# Patient Record
Sex: FEMALE | Race: ASIAN | Employment: OTHER
[De-identification: names, ages, dates, MRNs, and addresses within clinical notes are randomized per-mention and may not be internally consistent; named-entity substitution may affect disease eponyms.]

---

## 2017-05-18 ENCOUNTER — RECORDS - HEALTHEAST (OUTPATIENT)
Dept: ADMINISTRATIVE | Facility: OTHER | Age: 82
End: 2017-05-18

## 2017-12-02 ENCOUNTER — RECORDS - HEALTHEAST (OUTPATIENT)
Dept: ADMINISTRATIVE | Facility: OTHER | Age: 82
End: 2017-12-02

## 2017-12-12 ENCOUNTER — HOME CARE/HOSPICE - HEALTHEAST (OUTPATIENT)
Dept: HOME HEALTH SERVICES | Facility: HOME HEALTH | Age: 82
End: 2017-12-12

## 2021-02-02 ENCOUNTER — OFFICE VISIT (OUTPATIENT)
Dept: OPHTHALMOLOGY | Facility: CLINIC | Age: 86
End: 2021-02-02
Attending: OPHTHALMOLOGY
Payer: COMMERCIAL

## 2021-02-02 DIAGNOSIS — H01.009 BLEPHARITIS OF BOTH UPPER AND LOWER EYELID: ICD-10-CM

## 2021-02-02 DIAGNOSIS — H43.21 ASTEROID HYALOSIS OF RIGHT EYE: ICD-10-CM

## 2021-02-02 DIAGNOSIS — H40.9 GLAUCOMA OF BOTH EYES, UNSPECIFIED GLAUCOMA TYPE: ICD-10-CM

## 2021-02-02 DIAGNOSIS — H04.123 DRY EYE SYNDROME OF BOTH LACRIMAL GLANDS: ICD-10-CM

## 2021-02-02 DIAGNOSIS — Z96.1 PSEUDOPHAKIA OF BOTH EYES: ICD-10-CM

## 2021-02-02 DIAGNOSIS — H04.123 DRY EYE SYNDROME OF BOTH LACRIMAL GLANDS: Primary | ICD-10-CM

## 2021-02-02 PROCEDURE — G0463 HOSPITAL OUTPT CLINIC VISIT: HCPCS | Mod: 25

## 2021-02-02 PROCEDURE — 68761 CLOSE TEAR DUCT OPENING: CPT | Performed by: OPHTHALMOLOGY

## 2021-02-02 PROCEDURE — 99203 OFFICE O/P NEW LOW 30 MIN: CPT | Mod: 25 | Performed by: OPHTHALMOLOGY

## 2021-02-02 RX ORDER — MAGNESIUM HYDROXIDE 400 MG/5ML
1 SUSPENSION, ORAL (FINAL DOSE FORM) ORAL
COMMUNITY
Start: 2019-08-30

## 2021-02-02 RX ORDER — OLANZAPINE 10 MG/1
10 TABLET ORAL
COMMUNITY
Start: 2019-04-08

## 2021-02-02 RX ORDER — DONEPEZIL HYDROCHLORIDE 5 MG/1
TABLET, FILM COATED ORAL
COMMUNITY
Start: 2019-11-22

## 2021-02-02 RX ORDER — ALENDRONATE SODIUM 70 MG/1
TABLET ORAL
COMMUNITY
Start: 2020-08-05

## 2021-02-02 RX ORDER — MIDODRINE HYDROCHLORIDE 5 MG/1
TABLET ORAL
COMMUNITY
Start: 2019-04-18

## 2021-02-02 RX ORDER — CAPSAICIN 0.025 %
CREAM (GRAM) TOPICAL
COMMUNITY
Start: 2020-09-04

## 2021-02-02 RX ORDER — DOCUSATE SODIUM 100 MG/1
100 CAPSULE, LIQUID FILLED ORAL
COMMUNITY
Start: 2019-04-02

## 2021-02-02 RX ORDER — OLANZAPINE 7.5 MG/1
7.5 TABLET, FILM COATED ORAL
COMMUNITY
Start: 2019-12-19

## 2021-02-02 RX ORDER — TRIAMCINOLONE ACETONIDE 1 MG/G
CREAM TOPICAL
COMMUNITY

## 2021-02-02 RX ORDER — TRAZODONE HYDROCHLORIDE 50 MG/1
50 TABLET, FILM COATED ORAL
COMMUNITY
Start: 2019-12-18

## 2021-02-02 RX ORDER — PRIMIDONE 50 MG/1
50 TABLET ORAL
COMMUNITY
Start: 2019-12-09

## 2021-02-02 RX ORDER — PANTOPRAZOLE SODIUM 40 MG/1
40 TABLET, DELAYED RELEASE ORAL
COMMUNITY

## 2021-02-02 RX ORDER — ASCORBIC ACID 500 MG
TABLET ORAL
COMMUNITY
Start: 2019-04-12

## 2021-02-02 RX ORDER — PROPRANOLOL HYDROCHLORIDE 20 MG/1
20 TABLET ORAL
COMMUNITY

## 2021-02-02 RX ORDER — FEXOFENADINE HCL 180 MG/1
180 TABLET ORAL
COMMUNITY

## 2021-02-02 RX ORDER — ALUMINA, MAGNESIA, AND SIMETHICONE 2400; 2400; 240 MG/30ML; MG/30ML; MG/30ML
10-20 SUSPENSION ORAL
COMMUNITY

## 2021-02-02 RX ORDER — OMEGA-3 FATTY ACIDS/FISH OIL 300-1000MG
200 CAPSULE ORAL
COMMUNITY
Start: 2019-08-30

## 2021-02-02 RX ORDER — CARTEOLOL HYDROCHLORIDE 10 MG/ML
1 SOLUTION OPHTHALMIC
COMMUNITY

## 2021-02-02 RX ORDER — BRIMONIDINE TARTRATE 2 MG/ML
1 SOLUTION/ DROPS OPHTHALMIC
COMMUNITY
Start: 2019-04-08

## 2021-02-02 RX ORDER — FLUOCINONIDE TOPICAL SOLUTION USP, 0.05% 0.5 MG/ML
SOLUTION TOPICAL
COMMUNITY
Start: 2019-11-29

## 2021-02-02 RX ORDER — ASCORBATE CALCIUM 500 MG
TABLET ORAL
COMMUNITY
Start: 2019-05-18

## 2021-02-02 RX ORDER — BISACODYL 10 MG
10 SUPPOSITORY, RECTAL RECTAL
COMMUNITY

## 2021-02-02 RX ORDER — GABAPENTIN 400 MG/1
400 CAPSULE ORAL
COMMUNITY
Start: 2019-11-01

## 2021-02-02 RX ORDER — CARBOXYMETHYLCELLULOSE SODIUM 10 MG/ML
1 GEL OPHTHALMIC
COMMUNITY

## 2021-02-02 RX ORDER — VITAMIN E 268 MG
400 CAPSULE ORAL
COMMUNITY

## 2021-02-02 RX ORDER — DEXTROMETHORPHAN HBR. AND GUAIFENESIN 10; 100 MG/5ML; MG/5ML
5-10 SOLUTION ORAL
COMMUNITY

## 2021-02-02 RX ORDER — CALCIUM CARBONATE 500 MG/1
500 TABLET, CHEWABLE ORAL
COMMUNITY
Start: 2019-08-30

## 2021-02-02 RX ORDER — CHLORAL HYDRATE 500 MG
CAPSULE ORAL
COMMUNITY
Start: 2019-03-24

## 2021-02-02 RX ORDER — ATORVASTATIN CALCIUM 10 MG/1
TABLET, FILM COATED ORAL
COMMUNITY
Start: 2019-04-08

## 2021-02-02 RX ORDER — IBUPROFEN 200 MG
500 CAPSULE ORAL
COMMUNITY
Start: 2019-04-29

## 2021-02-02 RX ORDER — SODIUM PHOSPHATE,MONO-DIBASIC 19G-7G/118
1 ENEMA (ML) RECTAL
COMMUNITY

## 2021-02-02 ASSESSMENT — VISUAL ACUITY
OD_CC: 20/40
OS_CC: 20/70
OS_CC+: -1
METHOD: SNELLEN - LINEAR
CORRECTION_TYPE: GLASSES

## 2021-02-02 ASSESSMENT — CONF VISUAL FIELD
OS_SUPERIOR_NASAL_RESTRICTION: 1
OS_INFERIOR_TEMPORAL_RESTRICTION: 1
OD_SUPERIOR_NASAL_RESTRICTION: 3
OS_SUPERIOR_TEMPORAL_RESTRICTION: 1
OD_SUPERIOR_TEMPORAL_RESTRICTION: 3
OD_INFERIOR_TEMPORAL_RESTRICTION: 1
OS_INFERIOR_NASAL_RESTRICTION: 3
OD_INFERIOR_NASAL_RESTRICTION: 1

## 2021-02-02 ASSESSMENT — REFRACTION_WEARINGRX
OD_CYLINDER: +1.25
OD_AXIS: 176
OS_ADD: +2.50
OD_SPHERE: -0.25
OD_ADD: +2.50
OS_CYLINDER: +2.50
OS_AXIS: 171
OS_SPHERE: -1.50

## 2021-02-02 ASSESSMENT — TONOMETRY
OS_IOP_MMHG: 16
IOP_METHOD: TONOPEN
OD_IOP_MMHG: 12

## 2021-02-02 ASSESSMENT — EXTERNAL EXAM - LEFT EYE: OS_EXAM: NORMAL

## 2021-02-02 ASSESSMENT — EXTERNAL EXAM - RIGHT EYE: OD_EXAM: NORMAL

## 2021-02-02 NOTE — LETTER
2/2/2021       RE: Natalia Lucas  [address] [facility] [address]     Dear Colleague,    Thank you for referring your patient, Natalia Lucas, to the [facility] at [facility]. Please see a copy of my visit note below.    CC:  Referral from Dr. Arzate? at [facility], patient unhappy with vision     HPI:  86 y/o with PMHx hypotnestion, GERD, schizoaffective and bipolar d/o, dementia, A FIB, CVA, and POHx CEIOL each eye, PACG.    Patient is poor historian -- patient's chief concern is poor vision.  Described as blurry.  No pain,       POHx:  CEIOL each eye (right eye 11/10/2004  Dr. Michael , left eye 3/15/2006)   PACG  Glasses:  Yes, doesn't wear    FOHx:  Mother -- ?  Poor historian     Gtts:  Alphagan P BID each eye  Carteolol BID each eye    All:  Ibuprofen, latanoprost, penicillins, sulfa drugs     A/P:    # Blurry Vision OU  # Dry Eye Syndrome each eye.  Disc pres free art tears every 2 hours  Disc each eye lower lid plugs    # Blepharitis each eye  - likely affecting BCVA, however, did not have MRx today so potential unclear   - start preservative free artificial tears q1-2 hours  both eyes  - warm compresses, lid hygiene daily  - would defer refraction until after treating surface.   - punctal plugs placed BLL 2/2/21    # Pseudphakia each eye  - IOLs appear to be in good position and w/o PCO    # History of PACG OU  - no records available today -- previously followed at [facility] per caregiver.   - Fields by confrontation suggest severe stage  - currently using Alphagan and carteolol BID each eye  - continue regimen, will schedule to follow with Dr. Morgan  -- will need baseline testing and exam.    # Asteroid Hyalosis OD      Follow up:  Glaucoma 2-4 weeks baseline evaluation, DFE, MRx  Cornea prn       Again, thank you for allowing me to participate in the care of your patient.      Sincerely,    Elias Ling MD

## 2021-02-02 NOTE — PATIENT INSTRUCTIONS
Continue alphagan P and carteolol 2x/day both eyes    Start PRESERVATIVE FREE artificial tears every 1 to 2 hours while awake both eyes  Lubricating ointment at bedtime (e.g. Refresh PM or systane PM)  Warm compresses 5-10 minutes daily

## 2021-02-02 NOTE — NURSING NOTE
Chief Complaints and History of Present Illnesses   Patient presents with     Angle Closure Glaucoma Evaluation     Chief Complaint(s) and History of Present Illness(es)     Angle Closure Glaucoma Evaluation               Comments     Pt. States that VA has gotten progressively worse over the last year. No pain BE. No flashes or floaters BE.  Aruna EMERSON 12:46 PM February 2, 2021

## 2021-02-02 NOTE — PROGRESS NOTES
CC:  Referral from Dr. Arzate? at [facility], patient unhappy with vision     HPI:  86 y/o with PMHx hypotnestion, GERD, schizoaffective and bipolar d/o, dementia, A FIB, CVA, and POHx CEIOL each eye, PACG.    Patient is poor historian -- patient's chief concern is poor vision.  Described as blurry.  No pain,       POHx:  CEIOL each eye (right eye 11/10/2004  Dr. Michael , left eye 3/15/2006)   PACG  Glasses:  Yes, doesn't wear    FOHx:  Mother -- ?  Poor historian     Gtts:  Alphagan P BID each eye  Carteolol BID each eye    All:  Ibuprofen, latanoprost, penicillins, sulfa drugs     A/P:    # Blurry Vision OU  # Dry Eye Syndrome each eye.  disc pres free art tears  Disc each eye lower lid plugs    # Blepharitis each eye  - likely affecting BCVA, however, did not have MRx today so potential unclear   - start preservative free artificial tears q1-2 hours  both eyes  - warm compresses, lid hygiene daily  - would defer refraction until after treating surface.   - punctal plugs placed BLL 2/2/21    # Pseudphakia each eye  - IOLs appear to be in good position and w/o PCO    # History of PACG OU  - no records available today -- previously followed at [facility] per caregiver.   - Fields by confrontation suggest severe stage  - currently using Alphagan and carteolol BID each eye  - continue regimen, will schedule to follow with Dr. Morgan  -- will need baseline testing and exam.    # Asteroid Hyalosis OD      Follow up:  Glaucoma 2-4 weeks baseline evaluation, DFE, MRx  Cornea prn     --  Jason Goldberg, MD  Cornea & External Disease Fellow  Department of Ophthalmology and Visual Neurosciences    Attending Physician Attestation:  Complete documentation of historical and exam elements from today's encounter can be found in the full encounter summary report (not reduplicated in this progress note).  I personally obtained the chief complaint(s) and history of present illness.  I confirmed and edited as necessary the review of  systems, past medical/surgical history, family history, social history, and examination findings as documented by others; and I examined the patient myself.  I personally reviewed the relevant tests, images, and reports as documented above.  I formulated and edited as necessary the assessment and plan and discussed the findings and management plan with the patient and family. - Elias Ling MD

## 2021-02-15 ENCOUNTER — TELEPHONE (OUTPATIENT)
Dept: OPHTHALMOLOGY | Facility: CLINIC | Age: 86
End: 2021-02-15

## 2021-02-15 NOTE — TELEPHONE ENCOUNTER
[facility] Call Center    Phone Message    May a detailed message be left on voicemail: yes     Reason for Call: Other: Needs clarification on instructions for artifical tears. Order states Start PRESERVATIVE FREE artificial tears every 1 to 2 hours while awake both eyes. They need to know if this is PRN only? Please call back to clarify instructions.     Action Taken: Message routed to:  [facility]    Travel Screening: Not Applicable

## 2021-03-04 DIAGNOSIS — H40.9 GLAUCOMA OF BOTH EYES, UNSPECIFIED GLAUCOMA TYPE: Primary | ICD-10-CM
